# Patient Record
Sex: MALE | Race: WHITE | NOT HISPANIC OR LATINO | Employment: OTHER | URBAN - METROPOLITAN AREA
[De-identification: names, ages, dates, MRNs, and addresses within clinical notes are randomized per-mention and may not be internally consistent; named-entity substitution may affect disease eponyms.]

---

## 2019-01-01 ENCOUNTER — HOSPITAL ENCOUNTER (EMERGENCY)
Facility: HOSPITAL | Age: 79
End: 2019-02-19
Attending: EMERGENCY MEDICINE
Payer: MEDICARE

## 2019-01-01 VITALS
BODY MASS INDEX: 25.83 KG/M2 | OXYGEN SATURATION: 64 % | SYSTOLIC BLOOD PRESSURE: 49 MMHG | WEIGHT: 180 LBS | HEART RATE: 22 BPM | RESPIRATION RATE: 28 BRPM | DIASTOLIC BLOOD PRESSURE: 33 MMHG

## 2019-01-01 DIAGNOSIS — E87.20 METABOLIC ACIDOSIS: ICD-10-CM

## 2019-01-01 DIAGNOSIS — R19.8 PERFORATED VISCUS: Primary | ICD-10-CM

## 2019-01-01 DIAGNOSIS — I46.9 CARDIOPULMONARY ARREST WITH SUCCESSFUL RESUSCITATION: ICD-10-CM

## 2019-01-01 LAB
ALBUMIN SERPL BCP-MCNC: 1.3 G/DL
ALLENS TEST: ABNORMAL
ALP SERPL-CCNC: 96 U/L
ALT SERPL W/O P-5'-P-CCNC: 228 U/L
ANION GAP SERPL CALC-SCNC: 23 MMOL/L
ANISOCYTOSIS BLD QL SMEAR: SLIGHT
APTT BLDCRRT: >150 SEC
AST SERPL-CCNC: 526 U/L
BASOPHILS NFR BLD: 0 %
BILIRUB SERPL-MCNC: 1.2 MG/DL
BNP SERPL-MCNC: 141 PG/ML
BUN SERPL-MCNC: 21 MG/DL
CALCIUM SERPL-MCNC: 6.2 MG/DL
CHLORIDE SERPL-SCNC: 113 MMOL/L
CO2 SERPL-SCNC: 6 MMOL/L
CREAT SERPL-MCNC: 2.4 MG/DL
DELSYS: ABNORMAL
DIFFERENTIAL METHOD: ABNORMAL
EOSINOPHIL NFR BLD: 0 %
ERYTHROCYTE [DISTWIDTH] IN BLOOD BY AUTOMATED COUNT: 14.5 %
ERYTHROCYTE [SEDIMENTATION RATE] IN BLOOD BY WESTERGREN METHOD: 16 MM/H
EST. GFR  (AFRICAN AMERICAN): 29 ML/MIN/1.73 M^2
EST. GFR  (NON AFRICAN AMERICAN): 25 ML/MIN/1.73 M^2
FIO2: 100
GLUCOSE SERPL-MCNC: 294 MG/DL
GLUCOSE SERPL-MCNC: 346 MG/DL (ref 70–110)
HCO3 UR-SCNC: 8.1 MMOL/L (ref 24–28)
HCT VFR BLD AUTO: 29.8 %
HGB BLD-MCNC: 9.1 G/DL
INR PPP: 2.4
LACTATE SERPL-SCNC: >12 MMOL/L
LIPASE SERPL-CCNC: 47 U/L
LYMPHOCYTES NFR BLD: 47 %
MAGNESIUM SERPL-MCNC: 1.6 MG/DL
MCH RBC QN AUTO: 28.6 PG
MCHC RBC AUTO-ENTMCNC: 30.5 G/DL
MCV RBC AUTO: 94 FL
MIN VOL: 7.5
MODE: ABNORMAL
MONOCYTES NFR BLD: 3 %
NEUTROPHILS NFR BLD: 46 %
NEUTS BAND NFR BLD MANUAL: 4 %
PCO2 BLDA: 42.1 MMHG (ref 35–45)
PEEP: 5
PH SMN: 6.89 [PH] (ref 7.35–7.45)
PLATELET # BLD AUTO: 90 K/UL
PMV BLD AUTO: 12 FL
PO2 BLDA: 143 MMHG (ref 80–100)
POC BE: -24 MMOL/L
POC SATURATED O2: 96 % (ref 95–100)
POC TCO2: 9 MMOL/L (ref 23–27)
POIKILOCYTOSIS BLD QL SMEAR: SLIGHT
POTASSIUM SERPL-SCNC: 3.2 MMOL/L
PROT SERPL-MCNC: 2.9 G/DL
PROTHROMBIN TIME: 25.5 SEC
RBC # BLD AUTO: 3.18 M/UL
SAMPLE: ABNORMAL
SITE: ABNORMAL
SODIUM SERPL-SCNC: 142 MMOL/L
SP02: 93
T4 FREE SERPL-MCNC: 0.71 NG/DL
TROPONIN I SERPL DL<=0.01 NG/ML-MCNC: 0.09 NG/ML
TSH SERPL DL<=0.005 MIU/L-ACNC: <0.01 UIU/ML
VT: 450
WBC # BLD AUTO: 26.9 K/UL

## 2019-01-01 PROCEDURE — 85730 THROMBOPLASTIN TIME PARTIAL: CPT

## 2019-01-01 PROCEDURE — 85060 PATHOLOGIST REVIEW: ICD-10-PCS | Mod: ,,, | Performed by: PATHOLOGY

## 2019-01-01 PROCEDURE — 96366 THER/PROPH/DIAG IV INF ADDON: CPT

## 2019-01-01 PROCEDURE — 80053 COMPREHEN METABOLIC PANEL: CPT

## 2019-01-01 PROCEDURE — 96367 TX/PROPH/DG ADDL SEQ IV INF: CPT

## 2019-01-01 PROCEDURE — 36600 WITHDRAWAL OF ARTERIAL BLOOD: CPT

## 2019-01-01 PROCEDURE — 84484 ASSAY OF TROPONIN QUANT: CPT

## 2019-01-01 PROCEDURE — 84439 ASSAY OF FREE THYROXINE: CPT

## 2019-01-01 PROCEDURE — 96375 TX/PRO/DX INJ NEW DRUG ADDON: CPT | Mod: 59

## 2019-01-01 PROCEDURE — 85007 BL SMEAR W/DIFF WBC COUNT: CPT

## 2019-01-01 PROCEDURE — 99291 CRITICAL CARE FIRST HOUR: CPT | Mod: 25

## 2019-01-01 PROCEDURE — 96361 HYDRATE IV INFUSION ADD-ON: CPT | Mod: 59

## 2019-01-01 PROCEDURE — 36556 INSERT NON-TUNNEL CV CATH: CPT

## 2019-01-01 PROCEDURE — 93005 ELECTROCARDIOGRAM TRACING: CPT

## 2019-01-01 PROCEDURE — 99900035 HC TECH TIME PER 15 MIN (STAT)

## 2019-01-01 PROCEDURE — 83605 ASSAY OF LACTIC ACID: CPT

## 2019-01-01 PROCEDURE — 83880 ASSAY OF NATRIURETIC PEPTIDE: CPT

## 2019-01-01 PROCEDURE — 93010 EKG 12-LEAD: ICD-10-PCS | Mod: ,,, | Performed by: INTERNAL MEDICINE

## 2019-01-01 PROCEDURE — 25000003 PHARM REV CODE 250

## 2019-01-01 PROCEDURE — 83690 ASSAY OF LIPASE: CPT

## 2019-01-01 PROCEDURE — 25000003 PHARM REV CODE 250: Performed by: EMERGENCY MEDICINE

## 2019-01-01 PROCEDURE — 94002 VENT MGMT INPAT INIT DAY: CPT

## 2019-01-01 PROCEDURE — 63600175 PHARM REV CODE 636 W HCPCS: Performed by: EMERGENCY MEDICINE

## 2019-01-01 PROCEDURE — 31500 INSERT EMERGENCY AIRWAY: CPT

## 2019-01-01 PROCEDURE — 93010 ELECTROCARDIOGRAM REPORT: CPT | Mod: ,,, | Performed by: INTERNAL MEDICINE

## 2019-01-01 PROCEDURE — 87040 BLOOD CULTURE FOR BACTERIA: CPT

## 2019-01-01 PROCEDURE — 99284 EMERGENCY DEPT VISIT MOD MDM: CPT | Mod: GC,,, | Performed by: SURGERY

## 2019-01-01 PROCEDURE — 85027 COMPLETE CBC AUTOMATED: CPT

## 2019-01-01 PROCEDURE — 85060 BLOOD SMEAR INTERPRETATION: CPT | Mod: ,,, | Performed by: PATHOLOGY

## 2019-01-01 PROCEDURE — 85610 PROTHROMBIN TIME: CPT

## 2019-01-01 PROCEDURE — 99284 PR EMERGENCY DEPT VISIT,LEVEL IV: ICD-10-PCS | Mod: GC,,, | Performed by: SURGERY

## 2019-01-01 PROCEDURE — 82803 BLOOD GASES ANY COMBINATION: CPT

## 2019-01-01 PROCEDURE — 99292 CRITICAL CARE ADDL 30 MIN: CPT

## 2019-01-01 PROCEDURE — 96376 TX/PRO/DX INJ SAME DRUG ADON: CPT

## 2019-01-01 PROCEDURE — 96368 THER/DIAG CONCURRENT INF: CPT

## 2019-01-01 PROCEDURE — 84443 ASSAY THYROID STIM HORMONE: CPT

## 2019-01-01 PROCEDURE — 83735 ASSAY OF MAGNESIUM: CPT

## 2019-01-01 PROCEDURE — 96365 THER/PROPH/DIAG IV INF INIT: CPT

## 2019-01-01 RX ORDER — EPINEPHRINE 0.1 MG/ML
INJECTION INTRAVENOUS CODE/TRAUMA/SEDATION MEDICATION
Status: COMPLETED | OUTPATIENT
Start: 2019-01-01 | End: 2019-01-01

## 2019-01-01 RX ORDER — SODIUM CHLORIDE 9 MG/ML
1000 INJECTION, SOLUTION INTRAVENOUS
Status: COMPLETED | OUTPATIENT
Start: 2019-01-01 | End: 2019-01-01

## 2019-01-01 RX ORDER — DOPAMINE HYDROCHLORIDE 160 MG/100ML
10 INJECTION, SOLUTION INTRAVENOUS CONTINUOUS
Status: DISCONTINUED | OUTPATIENT
Start: 2019-01-01 | End: 2019-01-01 | Stop reason: HOSPADM

## 2019-01-01 RX ORDER — SODIUM CHLORIDE 9 MG/ML
1000 INJECTION, SOLUTION INTRAVENOUS CONTINUOUS
Status: ACTIVE | OUTPATIENT
Start: 2019-01-01 | End: 2019-01-01

## 2019-01-01 RX ORDER — SODIUM CHLORIDE 9 MG/ML
125 INJECTION, SOLUTION INTRAVENOUS ONCE
Status: DISCONTINUED | OUTPATIENT
Start: 2019-01-01 | End: 2019-01-01 | Stop reason: HOSPADM

## 2019-01-01 RX ORDER — SODIUM BICARBONATE 1 MEQ/ML
50 SYRINGE (ML) INTRAVENOUS
Status: COMPLETED | OUTPATIENT
Start: 2019-01-01 | End: 2019-01-01

## 2019-01-01 RX ORDER — NOREPINEPHRINE BITARTRATE/D5W 4MG/250ML
PLASTIC BAG, INJECTION (ML) INTRAVENOUS
Status: COMPLETED
Start: 2019-01-01 | End: 2019-01-01

## 2019-01-01 RX ORDER — NOREPINEPHRINE BITARTRATE/D5W 8 MG/250ML
3 PLASTIC BAG, INJECTION (ML) INTRAVENOUS CONTINUOUS
Status: DISCONTINUED | OUTPATIENT
Start: 2019-01-01 | End: 2019-01-01 | Stop reason: HOSPADM

## 2019-01-01 RX ORDER — SODIUM BICARBONATE 1 MEQ/ML
SYRINGE (ML) INTRAVENOUS CODE/TRAUMA/SEDATION MEDICATION
Status: COMPLETED | OUTPATIENT
Start: 2019-01-01 | End: 2019-01-01

## 2019-01-01 RX ORDER — NOREPINEPHRINE BITARTRATE/D5W 4MG/250ML
3 PLASTIC BAG, INJECTION (ML) INTRAVENOUS CONTINUOUS
Status: DISCONTINUED | OUTPATIENT
Start: 2019-01-01 | End: 2019-01-01

## 2019-01-01 RX ORDER — NOREPINEPHRINE BITARTRATE/D5W 4MG/250ML
0.05 PLASTIC BAG, INJECTION (ML) INTRAVENOUS CONTINUOUS
Status: DISCONTINUED | OUTPATIENT
Start: 2019-01-01 | End: 2019-01-01

## 2019-01-01 RX ADMIN — SODIUM CHLORIDE 1000 ML: 0.9 INJECTION, SOLUTION INTRAVENOUS at 07:02

## 2019-01-01 RX ADMIN — Medication 3 MCG/KG/MIN: at 09:02

## 2019-01-01 RX ADMIN — SODIUM BICARBONATE 50 MEQ: 84 INJECTION, SOLUTION INTRAVENOUS at 07:02

## 2019-01-01 RX ADMIN — NOREPINEPHRINE-DEXTROSE IV SOLUTION 4 MG/250ML-5% 3 MCG/KG/MIN: 4-5/250 SOLUTION at 07:02

## 2019-01-01 RX ADMIN — EPINEPHRINE 1 MG: 0.1 INJECTION, SOLUTION ENDOTRACHEAL; INTRACARDIAC; INTRAVENOUS at 07:02

## 2019-01-01 RX ADMIN — Medication 3 MCG/KG/MIN: at 07:02

## 2019-01-01 RX ADMIN — DOPAMINE HYDROCHLORIDE IN DEXTROSE 10 MCG/KG/MIN: 1.6 INJECTION, SOLUTION INTRAVENOUS at 09:02

## 2019-01-01 RX ADMIN — PIPERACILLIN AND TAZOBACTAM 4.5 G: 4; .5 INJECTION, POWDER, LYOPHILIZED, FOR SOLUTION INTRAVENOUS; PARENTERAL at 09:02

## 2019-01-01 RX ADMIN — SODIUM CHLORIDE, SODIUM LACTATE, POTASSIUM CHLORIDE, AND CALCIUM CHLORIDE 1000 ML: .6; .31; .03; .02 INJECTION, SOLUTION INTRAVENOUS at 09:02

## 2019-01-01 RX ADMIN — Medication 3 MCG/KG/MIN: at 08:02

## 2019-01-01 RX ADMIN — HYDROCORTISONE SODIUM SUCCINATE 100 MG: 100 INJECTION, POWDER, FOR SOLUTION INTRAMUSCULAR; INTRAVENOUS at 08:02

## 2019-01-01 RX ADMIN — SODIUM CHLORIDE 1000 ML: 0.9 INJECTION, SOLUTION INTRAVENOUS at 08:02

## 2019-01-01 RX ADMIN — SODIUM BICARBONATE 50 MEQ: 84 INJECTION INTRAVENOUS at 09:02

## 2019-01-05 PROBLEM — Z87.898 HISTORY OF PITUITARY TUMOR: Status: ACTIVE | Noted: 2019-01-01

## 2019-01-05 PROBLEM — R53.1 WEAKNESS: Status: ACTIVE | Noted: 2019-01-01

## 2019-01-05 PROBLEM — E03.9 ACQUIRED HYPOTHYROIDISM: Status: ACTIVE | Noted: 2019-01-01

## 2019-01-05 PROBLEM — E87.20 LACTIC ACIDOSIS: Status: ACTIVE | Noted: 2019-01-01

## 2019-01-05 PROBLEM — R65.21 SEPTIC SHOCK DUE TO UNDETERMINED ORGANISM: Status: ACTIVE | Noted: 2019-01-01

## 2019-01-05 PROBLEM — G93.41 ACUTE METABOLIC ENCEPHALOPATHY: Status: ACTIVE | Noted: 2019-01-01

## 2019-01-05 PROBLEM — R79.89 ELEVATED TROPONIN: Status: ACTIVE | Noted: 2019-01-01

## 2019-01-05 PROBLEM — A41.9 SEPSIS: Status: ACTIVE | Noted: 2019-01-01

## 2019-01-05 PROBLEM — L03.115 CELLULITIS OF RIGHT LOWER EXTREMITY: Status: ACTIVE | Noted: 2019-01-01

## 2019-01-05 PROBLEM — N40.0 BENIGN PROSTATIC HYPERPLASIA WITHOUT LOWER URINARY TRACT SYMPTOMS: Status: ACTIVE | Noted: 2019-01-01

## 2019-01-06 PROBLEM — D69.6 THROMBOCYTOPENIA, UNSPECIFIED: Status: ACTIVE | Noted: 2019-01-01

## 2019-01-06 PROBLEM — E83.42 HYPOMAGNESEMIA: Status: ACTIVE | Noted: 2019-01-01

## 2019-01-08 PROBLEM — E83.42 HYPOMAGNESEMIA: Status: RESOLVED | Noted: 2019-01-01 | Resolved: 2019-01-01

## 2019-01-08 PROBLEM — G93.41 ACUTE METABOLIC ENCEPHALOPATHY: Status: RESOLVED | Noted: 2019-01-01 | Resolved: 2019-01-01

## 2019-01-08 PROBLEM — E87.6 HYPOKALEMIA: Status: ACTIVE | Noted: 2019-01-01

## 2019-01-08 PROBLEM — E87.20 LACTIC ACIDOSIS: Status: RESOLVED | Noted: 2019-01-01 | Resolved: 2019-01-01

## 2019-02-19 NOTE — ED NOTES
Unable to insert an OG or NGT by multiple nurses. MD attempted without success. Blood noted in nares

## 2019-02-19 NOTE — ED NOTES
Spoke with Jose Alejandro at coroners office. States he is currently working at a case and will call back in the next 30min.

## 2019-02-19 NOTE — ED NOTES
Family at bedside. Requesting . House Supervisor made phone call to local  who is en route to ED.  Family presents a copy of living will. Family does not want further interventions.

## 2019-02-19 NOTE — ED NOTES
Family and family  at bedside performing last rites. Both dr minaya and dr guzman  Spoke with family and family has decided to withdraw care. Family aware and understands pts prognosis

## 2019-02-19 NOTE — ED NOTES
MD speaking to family, family reports he has a living will, unsure of what it states. They report they consent to central line and pressors at this time.

## 2019-02-19 NOTE — ED TRIAGE NOTES
Pt presents to ED in Cardiac Arrest from [facility]. Pt was found unresponsive by nursing staff after taking a shower tonight. Upon EMS arrival pt in PEA. CPR began, pt v fib once with EMS and defibrillated x 1, given 6 epi, 1 bicarb and dopamine with EMS, ROSC obtained for total of 5 minutes per EMS and while in route to ED pt PEA again. Pt arrived to ED bagging via et tube with jatinder in progress. Pt full code, family in route to ED. Pt has access via IO in Left Tibia and L humeral head.

## 2019-02-19 NOTE — ED PROVIDER NOTES
Encounter Date: 2/18/2019       History     Chief Complaint   Patient presents with    Cardiac Arrest     via [facility] em[facility] from [facility] per daughter pt had a witnessed arrest while in shower with staff about 1 hr ago, states had been lethargic since yesterday.      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  Patient had sepsis from a leg cellulitis approximately 1 month ago.  Following this extended admission he went to a rehab facility where he currently resides with a the goal of returning to home.  Wife states the patient had some abdominal pain over the past couple of days and decreased p.o. Intake.  The patient's daughter saw him earlier today and he seemed to have less energy but they thought it was related to being given a sleeping medication the night before.  Per EMS the patient was found unresponsive and in PEA.  They initiated ACLS protocol.  They state they had spontaneous return of circulation for approximately 5 min only.  Was treated with dopamine at that time but this has since been stopped.  Patient lost circulation again.  EN route they established an airway with a Combi tube.  Placed a left tibial intraosseous access.  Compressions with a ILA device.  Six doses of epinephrine.  One dose of IV bicarbonate.  They state the patient went into ventricular fibrillation at 1 point the patient was shocked 1 time.  Per EMS the patient is currently a systolic on arrival to the emergency department.  Approximate down time of 1 hr.          Review of patient's allergies indicates:   Allergen Reactions    Sulfa (sulfonamide antibiotics) Hives and Itching     Past Medical History:   Diagnosis Date    Brain tumor (benign)     CKD (chronic kidney disease)     Prostatic hypertrophy      Past Surgical History:   Procedure Laterality Date    BRAIN SURGERY       No  family history on file.  Social History     Tobacco Use    Smoking status: Former Smoker     Packs/day: 1.00     Years: 10.00     Pack years: 10.00     Types: Cigarettes   Substance Use Topics    Alcohol use: Yes     Comment: pt states he only drinks socially    Drug use: No     Review of Systems   Unable to perform ROS: Patient unresponsive       Physical Exam     Initial Vitals   BP Pulse Resp Temp SpO2   02/18/19 1917 02/18/19 1855 02/18/19 1917 -- 02/18/19 1919   (!) 43/27 (!) 0 20  (!) 90 %      MAP       --                Physical Exam    Nursing note and vitals reviewed.  Constitutional: He appears well-developed. He appears distressed.   HENT:   Head: Atraumatic.   Extremely dry oropharynx   Eyes:   Pupils irregular but dilated and fixed with no light response   Neck: No JVD present.   Cardiovascular:   No auscultated heart tones   Pulmonary/Chest:   Breath sounds clear bilaterally. But no spontaneous respirations   Abdominal: He exhibits distension.   Markedly tense distended abdomen   Genitourinary: Penis normal.   Musculoskeletal: He exhibits no edema.   Lymphadenopathy:     He has no cervical adenopathy.   Neurological:   GCS of 3   Skin: Capillary refill takes more than 3 seconds. There is pallor.         ED Course   Critical Care  Date/Time: 2/19/2019 1:11 AM  Performed by: Reyes Macias MD  Authorized by: Reyes Macias MD   Direct patient critical care time: 40 minutes  Additional history critical care time: 20 minutes  Ordering / reviewing critical care time: 10 minutes  Documentation critical care time: 12 minutes  Consulting other physicians critical care time: 15 minutes  Consult with family critical care time: 15 minutes  Total critical care time (exclusive of procedural time) : 112 minutes  Critical care time was exclusive of separately billable procedures and treating other patients and teaching time.  Critical care was necessary to treat or prevent imminent or life-threatening  deterioration of the following conditions: circulatory failure and respiratory failure.  Critical care was time spent personally by me on the following activities: development of treatment plan with patient or surrogate, discussions with consultants, interpretation of cardiac output measurements, evaluation of patient's response to treatment, examination of patient, obtaining history from patient or surrogate, ordering and performing treatments and interventions, ordering and review of laboratory studies, ordering and review of radiographic studies, pulse oximetry, re-evaluation of patient's condition, review of old charts and ventilator management.    Intubation  Date/Time: 2/19/2019 1:11 AM  Performed by: Reyes Macias MD  Authorized by: Reyes Macias MD   Consent Done: Emergent Situation  Indications: airway protection and  respiratory failure  Intubation method: direct  Patient status: unconscious  Preoxygenation: Combitube.  Laryngoscope size: Quispe 3  Tube size: 7.5 mm  Tube type: cuffed  Number of attempts: 1  Cricoid pressure: no  Cords visualized: yes  Post-procedure assessment: chest rise and CO2 detector  Breath sounds: clear  Cuff inflated: yes  ETT to lip: 23 cm  Tube secured with: ETT bhakta  Chest x-ray interpreted by me.  Chest x-ray findings: endotracheal tube in appropriate position  Patient tolerance: Patient tolerated the procedure well with no immediate complications  Complications: No    Central Line  Date/Time: 2/19/2019 1:12 AM  Performed by: Reyes Macias MD  Consent Done: Emergent Situation  Indications: vascular access and med administration  Preparation: skin prepped with ChloraPrep  Skin prep agent dried: skin prep agent completely dried prior to procedure  Sterile barriers: all five maximum sterile barriers used - cap, mask, sterile gown, sterile gloves, and large sterile sheet  Hand hygiene: hand hygiene performed prior to central venous catheter  insertion  Location details: left internal jugular  Catheter type: triple lumen  Catheter size: 7.5 Fr  Catheter Length: 15cm    Ultrasound guidance: yes  Vessel Caliber: medium, compressibility normal  Needle advanced into vessel with real time Ultrasound guidance.  Guidewire confirmed in vessel.  Sterile sheath used.  Number of attempts: 5 or more  Estimated blood loss (mL): 15  Post-procedure: line sutured,  chlorhexidine patch,  sterile dressing applied and blood return through all ports  Complications: Yes (first attempts times 4 made in right IJ. Unable to pass guide wire. One attempt on the left with successful placement but hematoma with venous oozing at catheter insertion site. )        Labs Reviewed   CBC W/ AUTO DIFFERENTIAL - Abnormal; Notable for the following components:       Result Value    WBC 26.90 (*)     RBC 3.18 (*)     Hemoglobin 9.1 (*)     Hematocrit 29.8 (*)     MCHC 30.5 (*)     Platelets 90 (*)     Mono% 3.0 (*)     All other components within normal limits   COMPREHENSIVE METABOLIC PANEL - Abnormal; Notable for the following components:    Potassium 3.2 (*)     Chloride 113 (*)     CO2 6 (*)     Glucose 294 (*)     Creatinine 2.4 (*)     Calcium 6.2 (*)     Total Protein 2.9 (*)     Albumin 1.3 (*)     Total Bilirubin 1.2 (*)      (*)      (*)     Anion Gap 23 (*)     eGFR if  29 (*)     eGFR if non  25 (*)     All other components within normal limits    Narrative:     CO2 and Calcium critical result(s) called and verbal readback   obtained from [employer] , 02/18/2019 21:18   LACTIC ACID, PLASMA - Abnormal; Notable for the following components:    Lactate (Lactic Acid) >12.0 (*)     All other components within normal limits    Narrative:     Lactic Acid critical result(s) called and verbal readback obtained   from [employer] , 02/18/2019 21:14   PROTIME-INR - Abnormal; Notable for the following components:    Prothrombin Time 25.5  (*)     INR 2.4 (*)     All other components within normal limits   APTT - Abnormal; Notable for the following components:    aPTT >150.0 (*)     All other components within normal limits    Narrative:     PTT critical result(s) called and verbal readback obtained from   LINDSAY JACKMANROSS, 02/18/2019 21:55   TSH - Abnormal; Notable for the following components:    TSH <0.010 (*)     All other components within normal limits   TROPONIN I - Abnormal; Notable for the following components:    Troponin I 0.085 (*)     All other components within normal limits   B-TYPE NATRIURETIC PEPTIDE - Abnormal; Notable for the following components:     (*)     All other components within normal limits   ISTAT PROCEDURE - Abnormal; Notable for the following components:    POC PH 6.890 (*)     POC PO2 143 (*)     POC HCO3 8.1 (*)     POC TCO2 9 (*)     All other components within normal limits   CULTURE, BLOOD   CULTURE, BLOOD   LIPASE   MAGNESIUM   T4, FREE   URINALYSIS, REFLEX TO URINE CULTURE   POCT GLUCOSE MONITORING CONTINUOUS          Imaging Results          X-Ray Chest AP Portable (Final result)  Result time 02/18/19 20:35:50    Final result by Fozia Pérez MD (02/18/19 20:35:50)                 Impression:      Pneumoperitoneum.  Recommend further evaluation with CT.    COMMUNICATION  This critical result was discovered/received on 2/18/2019 at 20:34.    The critical information above was relayed directly by Fozia Pérez MD by telephone to Dr. Reyes Macias on 2/18/2019 at 20:34.      Electronically signed by: Fozia Pérez MD  Date:    02/18/2019  Time:    20:35             Narrative:    EXAMINATION:  XR CHEST AP PORTABLE    CLINICAL HISTORY:  intubation and left IJ CVC;    TECHNIQUE:  Single frontal view of the chest was performed.    COMPARISON:  01/06/2019.    FINDINGS:  ET tube is visualized with distal tip approximately 1.5 cm above the chris.  Left IJ central venous catheter is visualized with  distal tip situated horizontally likely at the junction of left brachiocephalic vein and the SVC.  Lungs are significantly hypoinflated.  Heart is stable in size.  No evidence of pneumothorax.  Increased opacification is seen within the right upper lung zone.  There is pneumoperitoneum with large amount of right subdiaphragmatic free air seen.                                 with venous access and epinephrine and bicarb and Levophed circulation was restored.  Patient still has a GCS of 3.  discussed grave condition with prolonged down time with patient's wife and daughter upon their arrival.  Decision to continue treatment however if the patient was to code again further resuscitation efforts would not be done.  When chest x-ray was seen large amount of free air was noted. Consultation to General surgery made at that time.  Dr. Oh came to evaluate the patient and states the patient is not a surgical candidate.  After further discussions with the family decision to withdraw care was made.  Pressors were withdrawn and patient passed away.  Time of death 11:07 p.m..                       Clinical Impression:   The primary encounter diagnosis was Perforated viscus. Diagnoses of Cardiopulmonary arrest with successful resuscitation and Metabolic acidosis were also pertinent to this visit.                             Reyes Macias MD  02/19/19 0118

## 2019-02-19 NOTE — ED NOTES
[place]  Investigator Batool ramirez case needs to be reported to [facility] [place] due to pt being a short term resident at [facility] with plans to return home.

## 2019-02-19 NOTE — ED NOTES
Attempted to insert chang twice without success because Unable to blow up balloon. MD aware. Bright red blood oozing from penis.

## 2019-02-19 NOTE — CONSULTS
"[facility]  General Surgery  Consult Note    Consults  Subjective:     Chief Complaint/Reason for Admission: Altered mental status, circulatory arrest    History of Present Illness:   History is per chart and discussion with treating staff and family.  Patient is presently intubated, unresponsive, maxed out on Levvo for BP support.  77 y/o M who was at a Nursing home for rehab following cellulitis of his lower extremity was found unresponsive around 18:00 today. CPR may have been started after 15 minutes by EMS, who on arrival found the pt in PEA.     Per Nursing note:  "Pt v fib once with EMS and defibrillated x 1, given 6 epi, 1 bicarb and dopamine with EMS, ROSC obtained for total of 5 minutes per EMS and while in route to ED pt PEA again".    ROSC was obtained after CPr by ER staff. Patient was noted to have a distended abdomen, which the family report was not the case earlier this evening. CXR demonstrated pneumoperitoneum, for which our service was consulted.    Of note, attempts at [place] and NG/OG insertion were unsuccessful.    (Not in a hospital admission)    Review of patient's allergies indicates:   Allergen Reactions    Sulfa (sulfonamide antibiotics) Hives and Itching       Past Medical History:   Diagnosis Date    Brain tumor (benign)     CKD (chronic kidney disease)     Prostatic hypertrophy      Past Surgical History:   Procedure Laterality Date    BRAIN SURGERY       Family History     None        Tobacco Use    Smoking status: Former Smoker     Packs/day: 1.00     Years: 10.00     Pack years: 10.00     Types: Cigarettes   Substance and Sexual Activity    Alcohol use: Yes     Comment: pt states he only drinks socially    Drug use: No    Sexual activity: Yes     Partners: Female     Review of Systems Unable to obtain due to patient's condition.  Objective:     Weight: 81.6 kg (180 lb)  Body mass index is 25.83 kg/m².  Vital Signs (Most Recent):  Pulse: 74 (02/18/19 " 2122)  Resp: 15 (02/18/19 2122)  BP: (!) 54/38 (02/18/19 2122)  SpO2: (!) 86 % (02/18/19 2122) Vital Signs (24h Range):  Pulse:  [0-106] 74  Resp:  [10-20] 15  SpO2:  [86 %-98 %] 86 %  BP: (43-86)/(27-57) 54/38     Date 02/18/19 0700 - 02/19/19 0659   Shift 6837-0771 3706-1780 6944-7156 24 Hour Total   INTAKE   I.V.  750(9.2)  750(9.2)   Shift Total(mL/kg)  750(9.2)  750(9.2)   OUTPUT   Shift Total(mL/kg)       Weight (kg)  81.6 81.6 81.6              Vent Mode: PRVC  Oxygen Concentration (%):  [] 80  Resp Rate Total:  [18 br/min] 18 br/min  Vt Set:  [450 mL] 450 mL  PEEP/CPAP:  [5 cmH20] 5 cmH20  Mean Airway Pressure:  [13.3 cmH20] 13.3 cmH20    Intubated, on ventilatory support.  ETT in situ, left IJ central line.  Patient is unresponsive, pupils fixed and dilated.  No cough/gag reflex  Air entry equal bilaterally  Abdomen is distended, tense, reducible umbilical hernia, erythema over lower abdomen, old blood at right groin.  Penis and scrotum grossly normal  Cool feet, warm hands.    Significant Labs:  Recent Labs   Lab 02/18/19 2029   *      K 3.2*   *   CO2 6*   BUN 21   CREATININE 2.4*   CALCIUM 6.2*   MG 1.6     No results for input(s): WBC, HGB, HCT, PLT in the last 48 hours.  No results for input(s): LABPT, INR, APTT in the last 48 hours.  Microbiology Results (last 7 days)     Procedure Component Value Units Date/Time    Blood culture #2 [[id number]] Collected:  02/18/19 2105    Order Status:  Sent Specimen:  Blood from Peripheral, Wrist, Right Updated:  02/18/19 2110    Blood culture #1 [[id number]] Collected:  02/18/19 2029    Order Status:  Sent Specimen:  Blood from Line, Port A Cath Updated:  02/18/19 2038        Recent Lab Results       02/18/19 2029 02/18/19 2026 02/18/19 2018        Albumin 1.3         Alkaline Phosphatase 96         Allens Test     Pass              Anion Gap 23                  Total Bilirubin 1.2  Comment:  For infants and newborns,  interpretation of results should be based  on gestational age, weight and in agreement with clinical  observations.  Premature Infant recommended reference ranges:  Up to 24 hours.............<8.0 mg/dL  Up to 48 hours............<12.0 mg/dL  3-5 days..................<15.0 mg/dL  6-29 days.................<15.0 mg/dL             Comment:  Values of less than 100 pg/ml are consistent with non-CHF populations.         Site     RR     BUN, Bld 21         Calcium 6.2  Comment:  CO2 and Calcium critical result(s) called and verbal readback   obtained from Patience Ward , 02/18/2019 21:18           Chloride 113         CO2 6  Comment:  CO2 and Calcium critical result(s) called and verbal readback   obtained from Patience Ward , 02/18/2019 21:18           Creatinine 2.4         DelSys     Adult Vent     eGFR if  29         eGFR if non  25  Comment:  Calculation used to obtain the estimated glomerular filtration  rate (eGFR) is the CKD-EPI equation.            FiO2     100     Glucose 294         Hematocrit 29.8         Hemoglobin 9.1         Lactate, Gordon >12.0  Comment:  Falsely low lactic acid results can be found in samples   containing >=13.0 mg/dL total bilirubin and/or >=3.5 mg/dL   direct bilirubin.  Lactic Acid critical result(s) called and verbal readback obtained   from Patience Sylvia , 02/18/2019 21:14           Lipase 47         Magnesium 1.6         MCH 28.6         MCHC 30.5         MCV 94         Min Vol     7.5     Mode     AC/PRVC     PEEP     5     POC BE     -24     POC Glucose   346       POC HCO3     8.1     POC PCO2     42.1     POC PH     6.890     POC PO2     143     POC SATURATED O2     96     POC TCO2     9     Potassium 3.2         Total Protein 2.9         Rate     16     RBC 3.18         RDW 14.5         Sample     ARTERIAL     Sodium 142         Sp02     93     Troponin I 0.085  Comment:  The reference interval for Troponin I represents the 99th  percentile   cutoff   for our facility and is consistent with 3rd generation assay   performance.           Vt     450     WBC 26.90             Significant Diagnostics:  I have reviewed all pertinent imaging results/findings within the past 24 hours.  CXR with pneumoperitoneum and dilated bowel loops    Assessment/Plan:     #Pneumoperitoneum  -Uncertain aetiology.  -Discussed clinical findings with family, including possibility of cerebral ischemia, poor neurologic function, abdominal and radiologic findings, aberrant lab values.  -Family would not like to proceed with any surgical intervention at this time. We are in accordance with his decision.  Thank you for your consult. I will sign off. Please contact us if you have any additional questions.    Migdalia Oh MD  General Surgery  [facility]

## 2019-02-20 LAB — PATH REV BLD -IMP: NORMAL

## 2019-02-23 LAB
BACTERIA BLD CULT: NORMAL
BACTERIA BLD CULT: NORMAL